# Patient Record
Sex: FEMALE | Race: BLACK OR AFRICAN AMERICAN | URBAN - METROPOLITAN AREA
[De-identification: names, ages, dates, MRNs, and addresses within clinical notes are randomized per-mention and may not be internally consistent; named-entity substitution may affect disease eponyms.]

---

## 2018-09-24 ENCOUNTER — HOSPITAL ENCOUNTER (EMERGENCY)
Age: 33
Discharge: HOME | End: 2018-09-24

## 2018-09-24 VITALS
HEART RATE: 72 BPM | SYSTOLIC BLOOD PRESSURE: 151 MMHG | OXYGEN SATURATION: 97 % | DIASTOLIC BLOOD PRESSURE: 101 MMHG | HEIGHT: 65 IN | TEMPERATURE: 98.8 F | BODY MASS INDEX: 38.2 KG/M2 | WEIGHT: 229.28 LBS | RESPIRATION RATE: 16 BRPM

## 2018-09-24 DIAGNOSIS — L50.9 URTICARIA: Primary | ICD-10-CM

## 2018-09-24 DIAGNOSIS — I10 ESSENTIAL HYPERTENSION: ICD-10-CM

## 2018-09-24 PROCEDURE — 99282 EMERGENCY DEPT VISIT SF MDM: CPT

## 2018-09-24 PROCEDURE — 6370000000 HC RX 637 (ALT 250 FOR IP): Performed by: PHYSICIAN ASSISTANT

## 2018-09-24 RX ORDER — FAMOTIDINE 20 MG/1
20 TABLET, FILM COATED ORAL 2 TIMES DAILY
Qty: 20 TABLET | Refills: 0 | Status: SHIPPED | OUTPATIENT
Start: 2018-09-24

## 2018-09-24 RX ORDER — FAMOTIDINE 20 MG/1
20 TABLET, FILM COATED ORAL ONCE
Status: COMPLETED | OUTPATIENT
Start: 2018-09-24 | End: 2018-09-24

## 2018-09-24 RX ORDER — PREDNISONE 20 MG/1
40 TABLET ORAL DAILY
Qty: 8 TABLET | Refills: 0 | Status: SHIPPED | OUTPATIENT
Start: 2018-09-24 | End: 2018-09-28

## 2018-09-24 RX ORDER — PREDNISONE 20 MG/1
60 TABLET ORAL ONCE
Status: COMPLETED | OUTPATIENT
Start: 2018-09-24 | End: 2018-09-24

## 2018-09-24 RX ADMIN — FAMOTIDINE 20 MG: 20 TABLET ORAL at 20:39

## 2018-09-24 RX ADMIN — PREDNISONE 60 MG: 20 TABLET ORAL at 20:39

## 2018-09-24 ASSESSMENT — ENCOUNTER SYMPTOMS
WHEEZING: 0
VOMITING: 0
STRIDOR: 0
SHORTNESS OF BREATH: 0

## 2018-09-25 NOTE — ED PROVIDER NOTES
[facility]  eMERGENCY dEPARTMENT eNCOUnter      Pt Name: Elvis Aguilar  MRN: [id number]  [place] 1985  Date of evaluation: 9/24/2018  Provider: Vladimir Jordan, [address] [place] [address]       Chief Complaint   Patient presents with    Allergic Reaction     reports intermit hives and itching since last Thursday, daily, returns each day in a different area. today finger swollen with no active hives at this time. no SOB. HISTORY OF PRESENT ILLNESS  (Location/Symptom, Timing/Onset, Context/Setting, Quality, Duration, Modifying Factors, Severity.)   Elvis Aguilar is a 34 y.o. female who presents to the emergency department complaining of intermittent hives. Symptoms started last Thursday after she started a new laundry detergent. No new soaps or lotions or sense. No new medications. No chronic medical problems that she is aware of. No vomiting or fevers. No shortness of breath or tongue swelling. No alpha masses had a rash. No new pets. No allergies known other than copper. She did not take anything at home for the symptoms. The rash completely resolves every day and then comes back. Nursing Notes were reviewed and I agree. REVIEW OF SYSTEMS    (2-9 systems for level 4, 10 or more for level 5)     Review of Systems   Constitutional: Negative for fever. Respiratory: Negative for shortness of breath, wheezing and stridor. Gastrointestinal: Negative for vomiting. Musculoskeletal: Negative for arthralgias. Skin: Positive for rash. Negative for wound. Neurological: Negative for weakness and numbness. Psychiatric/Behavioral: Negative for agitation, behavioral problems and confusion. Except as noted above the remainder of the review of systems was reviewed and negative. PAST MEDICAL HISTORY   No past medical history on file. SURGICAL HISTORY     No past surgical history on file.     CURRENT MEDICATIONS       Previous Medications    No medications on file       ALLERGIES     Patient has no known allergies. FAMILY HISTORY     No family history on file. No family status information on file. SOCIAL HISTORY          PHYSICAL EXAM    (up to 7 for level 4, 8 or more for level 5)     ED Triage Vitals   BP Temp Temp Source Pulse Resp SpO2 Height Weight   09/24/18 1926 09/24/18 1926 09/24/18 1926 09/24/18 1926 09/24/18 1926 09/24/18 1926 09/24/18 2025 09/24/18 2025   (!) 156/104 98.9 °F (37.2 °C) Oral 83 18 99 % 5' 5\" (1.651 m) 229 lb 4.5 oz (104 kg)     Physical Exam   Constitutional: She is oriented to person, place, and time. She appears well-developed and well-nourished. No distress. HENT:   Head: Normocephalic and atraumatic. Eyes: Pupils are equal, round, and reactive to light. Neck: Normal range of motion. Cardiovascular: Normal rate and normal heart sounds. Pulmonary/Chest: Effort normal. No respiratory distress. Musculoskeletal: Normal range of motion. Neurological: She is alert and oriented to person, place, and time. Skin: Skin is warm. Rash noted. Psychiatric: She has a normal mood and affect. Her behavior is normal.   Nursing note and vitals reviewed. DIAGNOSTIC RESULTS     NONE    LABS:  Labs Reviewed - No data to display    All other labs were within normal range or not returned as of this dictation. EMERGENCY DEPARTMENT COURSE and DIFFERENTIAL DIAGNOSIS/MDM:   Vitals:    Vitals:    09/24/18 1926 09/24/18 2025   BP: (!) 156/104 (!) 151/101   Pulse: 83 72   Resp: 18 16   Temp: 98.9 °F (37.2 °C) 98.8 °F (37.1 °C)   TempSrc: Oral    SpO2: 99% 97%   Weight:  229 lb 4.5 oz (104 kg)   Height:  5' 5\" (1.651 m)     I discussed with Chase Lala and/or family the exam results, diagnosis, care, prognosis, reasons to return and the importance of follow up. Patient and/or family is in full agreement with plan and all questions have been answered.   Specific discharge instructions explained,

## 2023-01-26 ENCOUNTER — HOSPITAL ENCOUNTER (EMERGENCY)
Age: 38
Discharge: HOME | End: 2023-01-26
Payer: COMMERCIAL

## 2023-01-26 VITALS
TEMPERATURE: 99.7 F | HEART RATE: 70 BPM | RESPIRATION RATE: 20 BRPM | SYSTOLIC BLOOD PRESSURE: 151 MMHG | DIASTOLIC BLOOD PRESSURE: 90 MMHG | OXYGEN SATURATION: 100 %

## 2023-01-26 DIAGNOSIS — S29.019A THORACIC MYOFASCIAL STRAIN, INITIAL ENCOUNTER: Primary | ICD-10-CM

## 2023-01-26 PROCEDURE — 99283 EMERGENCY DEPT VISIT LOW MDM: CPT

## 2023-01-26 RX ORDER — CYCLOBENZAPRINE HCL 5 MG
5 TABLET ORAL 2 TIMES DAILY PRN
Qty: 10 TABLET | Refills: 0 | Status: SHIPPED | OUTPATIENT
Start: 2023-01-26 | End: 2023-02-05

## 2023-01-26 ASSESSMENT — ENCOUNTER SYMPTOMS
EYE PAIN: 0
ABDOMINAL PAIN: 0
NAUSEA: 0
DIARRHEA: 0
SHORTNESS OF BREATH: 0
RHINORRHEA: 0
SORE THROAT: 0
VOMITING: 0
BACK PAIN: 1
CONSTIPATION: 0
COUGH: 0

## 2023-01-26 NOTE — DISCHARGE INSTRUCTIONS
Muscle relaxant as needed. As this medication can make you drowsy    Continue to take Tylenol ibuprofen for pain as needed    Return to emergency room for any chest pain, shortness of breath or difficulty breathing or worsening pain.     Follow-up with primary care doctor next week for recheck

## 2023-01-26 NOTE — ED PROVIDER NOTES
[facility]        Pt Name: Cal Jernigan  MRN: [id number]  [place] 1985  Date of evaluation: 1/26/2023  Provider: JER Singh  PCP: No primary care provider on file. Note Started: 3:31 PM EST 1/26/23      MANDIE. I have evaluated this patient. My supervising physician was available for consultation. CHIEF COMPLAINT       Chief Complaint   Patient presents with    Back Pain     Was doing a [employer] class yesterday and thinks pulled a muscle in left side of back. Took tylenol and ibuprofen yesterday with minimal relief. Also states pain is causing nausea       HISTORY OF PRESENT ILLNESS: 1 or more Elements     History from : Patient    Limitations to history : None    Cal Client is a 30 y.o. 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 Patient states that she was wrestling with other participants in the class at the time and think she may have strained a muscle in her left upper back. Patient states that she tried Tylenol ibuprofen with little to no relief. Patient has any numbness tingling or loss sensation down either extremity. Patient states that she has had similar episodes like this in the past and muscle relaxants have been helpful for her. Patient denies any trauma, falls or direct injury to this area. Nursing Notes were all reviewed and agreed with or any disagreements were addressed in the HPI. REVIEW OF SYSTEMS :      Review of Systems   Constitutional:  Negative for chills, diaphoresis and fever. HENT:  Negative for congestion, rhinorrhea and sore throat. Eyes:  Negative for pain and visual disturbance. Respiratory:  Negative for cough and shortness of breath. Cardiovascular:  Negative for chest pain and leg swelling.    Gastrointestinal:  Negative for abdominal pain, constipation, diarrhea, nausea and vomiting. Genitourinary:  Negative for difficulty urinating, dysuria and frequency. Musculoskeletal:  Positive for back pain. Negative for neck pain. Skin:  Negative for rash and wound. Neurological:  Negative for dizziness and light-headedness. Positives and Pertinent negatives as per HPI. SURGICAL HISTORY   History reviewed. No pertinent surgical history. [address]       Discharge Medication List as of 1/26/2023 12:04 PM        CONTINUE these medications which have NOT CHANGED    Details   famotidine (PEPCID) 20 MG tablet Take 1 tablet by mouth 2 times daily, Disp-20 tablet, R-0Print             ALLERGIES     Patient has no known allergies. FAMILYHISTORY     History reviewed. No pertinent family history. SOCIAL HISTORY       Social History     Tobacco Use    Smoking status: Never    Smokeless tobacco: Never   Substance Use Topics    Alcohol use: No    Drug use: No       SCREENINGS        [place] Coma Scale  Eye Opening: Spontaneous  Best Verbal Response: Oriented  Best Motor Response: Obeys commands  [place] Coma Scale Score: 15                CIWA Assessment  BP: (!) 151/90  Heart Rate: 70           PHYSICAL EXAM  1 or more Elements     ED Triage Vitals [01/26/23 1144]   BP Temp Temp Source Heart Rate Resp SpO2 Height Weight   (!) 151/90 99.7 °F (37.6 °C) Oral 70 20 100 % -- --       Physical Exam  Vitals and nursing note reviewed. Constitutional:       General: She is not in acute distress. Appearance: She is normal weight. She is not ill-appearing. Cardiovascular:      Rate and Rhythm: Normal rate and regular rhythm. Pulses: Normal pulses. Heart sounds: Normal heart sounds. Pulmonary:      Effort: Pulmonary effort is normal.      Breath sounds: Normal breath sounds. Musculoskeletal:      Cervical back: Normal range of motion and neck supple. Back:       Comments: Tenderness palpation of the left mid thoracic area.   There is no crepitus or step-offs along any of the ribs. Patient has normal expansion of the lungs with deep breathing. There is no vesicular lesions or rashes noted about the skin   Neurological:      Mental Status: She is alert. Psychiatric:         Mood and Affect: Mood normal.         Behavior: Behavior normal.           DIAGNOSTIC RESULTS   LABS:    Labs Reviewed - No data to display    When ordered only abnormal lab results are displayed. All other labs were within normal range or not returned as of this dictation. EKG: When ordered, EKG's are interpreted by the Emergency Department Physician in the absence of a cardiologist.  Please see their note for interpretation of EKG. RADIOLOGY:   Non-plain film images such as CT, Ultrasound and MRI are read by the radiologist. Plain radiographic images are visualized and preliminarily interpreted by the ED Provider with the below findings:        Interpretation per the Radiologist below, if available at the time of this note:    No orders to display     No results found. No results found. PROCEDURES   Unless otherwise noted below, none     Procedures    CRITICAL CARE TIME (.cctime)       PAST MEDICAL HISTORY      has no past medical history on file. Chronic Conditions affecting Care:     EMERGENCY DEPARTMENT COURSE and DIFFERENTIAL DIAGNOSIS/MDM:   Vitals:    Vitals:    01/26/23 1144   BP: (!) 151/90   Pulse: 70   Resp: 20   Temp: 99.7 °F (37.6 °C)   TempSrc: Oral   SpO2: 100%       Patient was given the following medications:  Medications - No data to display          Is this patient to be included in the SEP-1 Core Measure due to severe sepsis or septic shock? No   Exclusion criteria - the patient is NOT to be included for SEP-1 Core Measure due to:   Infection is not suspected    CONSULTS: (Who and What was discussed)  None  Discussion with Other Profesionals : None    Social Determinants : None    Records Reviewed : None    CC/HPI Summary, DDx, ED Course, and Reassessment: 61-year-old female presents emerged part due to the strain of her left upper back after being in a crisis prevention [place] training class yesterday. On exam patient has tenderness to palpation over the muscles of the left upper back. Lungs are clear to auscultation and has normal excursion of the chest expansion. Palpation along the ribs does not reveal any crepitus or step-offs or deviations. Patient does not want any imaging and does not want any narcotic type pain medication. She declines Toradol injection today as she is scared of needles. Patient is wanting muscle relaxant to take at home. Patient be discharged with prescription for muscle relaxants and to follow-up with her primary care doctor if symptoms not improve in 1 week. Patient also encouraged to take Tylenol and ibuprofen as needed for pain. Low suspicion for fracture, dislocation, pneumothorax, hemothorax or other acute emergent etiologies at this time. Disposition Considerations (include 1 Tests not done, Shared Decision Making, Pt Expectation of Test or Tx.): I considered imaging of the ribs but the patient declined any further imaging today. Appropriate for outpatient management        I am the Primary Clinician of Record. FINAL IMPRESSION      1.  Thoracic myofascial strain, initial encounter          DISPOSITION/PLAN     DISPOSITION Decision To Discharge 01/26/2023 11:59:25 AM      PATIENT REFERRED TO:  [facility] Emergency Department  [address]  [address] [place] [address]  [phone]    As needed, If symptoms worsen      DISCHARGE MEDICATIONS:  Discharge Medication List as of 1/26/2023 12:04 PM        START taking these medications    Details   cyclobenzaprine (FLEXERIL) 5 MG tablet Take 1 tablet by mouth 2 times daily as needed for Muscle spasms, Disp-10 tablet, R-0Normal             DISCONTINUED MEDICATIONS:  Discharge Medication List as of 1/26/2023 12:04 PM                 (Please note that portions of this note were completed with a voice recognition program.  Efforts were made to edit the dictations but occasionally words are mis-transcribed.)    JER Manrique (electronically signed)            JER Manrique  01/26/23 [id number]

## 2023-01-26 NOTE — Clinical Note
Fide Miranda was seen and treated in our emergency department on 1/26/2023. She may return to work on 01/27/2023. If you have any questions or concerns, please don't hesitate to call.       JER Menard

## 2024-05-11 ENCOUNTER — APPOINTMENT (OUTPATIENT)
Dept: GENERAL RADIOLOGY | Age: 39
End: 2024-05-11

## 2024-05-11 ENCOUNTER — HOSPITAL ENCOUNTER (EMERGENCY)
Age: 39
Discharge: HOME | End: 2024-05-11

## 2024-05-11 VITALS
DIASTOLIC BLOOD PRESSURE: 94 MMHG | TEMPERATURE: 98.5 F | BODY MASS INDEX: 31.07 KG/M2 | OXYGEN SATURATION: 100 % | SYSTOLIC BLOOD PRESSURE: 155 MMHG | HEART RATE: 70 BPM | RESPIRATION RATE: 20 BRPM | WEIGHT: 186.7 LBS

## 2024-05-11 DIAGNOSIS — M25.362 KNEE INSTABILITY, LEFT: ICD-10-CM

## 2024-05-11 DIAGNOSIS — M25.562 ACUTE PAIN OF LEFT KNEE: Primary | ICD-10-CM

## 2024-05-11 PROCEDURE — 99283 EMERGENCY DEPT VISIT LOW MDM: CPT

## 2024-05-11 PROCEDURE — 73562 X-RAY EXAM OF KNEE 3: CPT

## 2024-05-11 ASSESSMENT — ENCOUNTER SYMPTOMS
SHORTNESS OF BREATH: 0
BACK PAIN: 0
VOMITING: 0
COUGH: 0
RHINORRHEA: 0
DIARRHEA: 0
ABDOMINAL PAIN: 0
NAUSEA: 0
EYE REDNESS: 0
EYE DISCHARGE: 0
STRIDOR: 0
SORE THROAT: 0
EYE ITCHING: 0

## 2024-05-11 ASSESSMENT — PAIN DESCRIPTION - LOCATION: LOCATION: KNEE

## 2024-05-11 ASSESSMENT — PAIN - FUNCTIONAL ASSESSMENT: PAIN_FUNCTIONAL_ASSESSMENT: 0-10

## 2024-05-11 ASSESSMENT — PAIN SCALES - GENERAL: PAINLEVEL_OUTOF10: 10

## 2024-05-11 ASSESSMENT — PAIN DESCRIPTION - ORIENTATION: ORIENTATION: LEFT

## 2024-05-11 NOTE — ED PROVIDER NOTES
Admit vs D/C, Shared Decision Making, Pt Expectation of Test or Tx.): I estimate there is LOW risk for COMPARTMENT SYNDROME, DEEP VENOUS THROMBOSIS, SEPTIC ARTHRITIS, TENDON OR NEUROVASCULAR INJURY, thus I consider the discharge disposition reasonable.             I am the Primary Clinician of Record.  FINAL IMPRESSION      1. Acute pain of left knee    2. Knee instability, left          DISPOSITION/PLAN     DISPOSITION Decision To Discharge 05/11/2024 12:42:53 PM      PATIENT REFERRED TO:  [facility]  [address]  [address]  [place] [address]  [phone]  Schedule an appointment as soon as possible for a visit in 3 days      [facility] Emergency Department  [address] [address]  [phone]    As needed, If symptoms worsen      DISCHARGE MEDICATIONS:  Discharge Medication List as of 5/11/2024  1:05 PM          DISCONTINUED MEDICATIONS:  Discharge Medication List as of 5/11/2024  1:05 PM                 (Please note that portions of this note were completed with a voice recognition program.  Efforts were made to edit the dictations but occasionally words are mis-transcribed.)    JER Sanabria (electronically signed)            Li Blanco PA  05/11/24 3645